# Patient Record
Sex: FEMALE | Race: WHITE | NOT HISPANIC OR LATINO | Employment: FULL TIME | URBAN - METROPOLITAN AREA
[De-identification: names, ages, dates, MRNs, and addresses within clinical notes are randomized per-mention and may not be internally consistent; named-entity substitution may affect disease eponyms.]

---

## 2020-07-16 PROCEDURE — U0003 INFECTIOUS AGENT DETECTION BY NUCLEIC ACID (DNA OR RNA); SEVERE ACUTE RESPIRATORY SYNDROME CORONAVIRUS 2 (SARS-COV-2) (CORONAVIRUS DISEASE [COVID-19]), AMPLIFIED PROBE TECHNIQUE, MAKING USE OF HIGH THROUGHPUT TECHNOLOGIES AS DESCRIBED BY CMS-2020-01-R: HCPCS | Performed by: PERSONAL EMERGENCY RESPONSE ATTENDANT

## 2020-07-19 ENCOUNTER — TELEPHONE (OUTPATIENT)
Dept: URGENT CARE | Facility: CLINIC | Age: 20
End: 2020-07-19

## 2021-01-07 ENCOUNTER — OFFICE VISIT (OUTPATIENT)
Dept: NEUROLOGY | Facility: CLINIC | Age: 21
End: 2021-01-07

## 2021-01-07 VITALS
SYSTOLIC BLOOD PRESSURE: 118 MMHG | DIASTOLIC BLOOD PRESSURE: 82 MMHG | WEIGHT: 265 LBS | TEMPERATURE: 96.4 F | OXYGEN SATURATION: 99 % | HEART RATE: 121 BPM | HEIGHT: 65 IN | BODY MASS INDEX: 44.15 KG/M2

## 2021-01-07 DIAGNOSIS — R20.8 DYSESTHESIA: ICD-10-CM

## 2021-01-07 DIAGNOSIS — R20.2 PARESTHESIAS: Primary | ICD-10-CM

## 2021-01-07 PROCEDURE — 99204 OFFICE O/P NEW MOD 45 MIN: CPT | Performed by: PSYCHIATRY & NEUROLOGY

## 2021-01-07 RX ORDER — CYCLOBENZAPRINE HCL 10 MG
TABLET ORAL
COMMUNITY
Start: 2020-12-30

## 2021-01-07 RX ORDER — QUETIAPINE FUMARATE 200 MG/1
200 TABLET, FILM COATED ORAL
COMMUNITY
Start: 2020-12-30

## 2021-01-07 RX ORDER — TRAZODONE HYDROCHLORIDE 100 MG/1
100 TABLET ORAL
COMMUNITY
Start: 2020-12-30 | End: 2023-01-04

## 2021-01-07 RX ORDER — PRAZOSIN HYDROCHLORIDE 5 MG/1
5 CAPSULE ORAL
COMMUNITY
Start: 2020-12-30

## 2021-01-07 RX ORDER — HYDROXYZINE PAMOATE 50 MG/1
CAPSULE ORAL
COMMUNITY
Start: 2020-12-30 | End: 2023-01-04 | Stop reason: SDUPTHER

## 2021-01-07 RX ORDER — MELOXICAM 15 MG/1
TABLET ORAL
COMMUNITY
Start: 2020-12-29

## 2021-01-07 RX ORDER — MULTIPLE VITAMINS W/ MINERALS TAB 9MG-400MCG
1 TAB ORAL DAILY
COMMUNITY

## 2021-01-07 RX ORDER — LEVOTHYROXINE SODIUM 175 UG/1
175 TABLET ORAL DAILY
COMMUNITY
Start: 2020-11-30

## 2021-01-11 ENCOUNTER — LAB (OUTPATIENT)
Dept: LAB | Facility: HOSPITAL | Age: 21
End: 2021-01-11

## 2021-01-11 DIAGNOSIS — R20.8 DYSESTHESIA: ICD-10-CM

## 2021-01-11 LAB
ERYTHROCYTE [SEDIMENTATION RATE] IN BLOOD: 12 MM/HR (ref 0–20)
FOLATE SERPL-MCNC: 10.1 NG/ML (ref 4.78–24.2)
RPR SER QL: NORMAL
VIT B12 BLD-MCNC: 602 PG/ML (ref 211–946)

## 2021-01-11 PROCEDURE — 83516 IMMUNOASSAY NONANTIBODY: CPT

## 2021-01-11 PROCEDURE — 86235 NUCLEAR ANTIGEN ANTIBODY: CPT

## 2021-01-11 PROCEDURE — 86592 SYPHILIS TEST NON-TREP QUAL: CPT

## 2021-01-11 PROCEDURE — 86038 ANTINUCLEAR ANTIBODIES: CPT

## 2021-01-11 PROCEDURE — 85652 RBC SED RATE AUTOMATED: CPT

## 2021-01-11 PROCEDURE — 84155 ASSAY OF PROTEIN SERUM: CPT

## 2021-01-11 PROCEDURE — 86618 LYME DISEASE ANTIBODY: CPT

## 2021-01-11 PROCEDURE — 86225 DNA ANTIBODY NATIVE: CPT

## 2021-01-11 PROCEDURE — 84165 PROTEIN E-PHORESIS SERUM: CPT

## 2021-01-11 PROCEDURE — 36415 COLL VENOUS BLD VENIPUNCTURE: CPT

## 2021-01-11 PROCEDURE — 82595 ASSAY OF CRYOGLOBULIN: CPT

## 2021-01-11 PROCEDURE — 82784 ASSAY IGA/IGD/IGG/IGM EACH: CPT

## 2021-01-11 PROCEDURE — 82607 VITAMIN B-12: CPT

## 2021-01-11 PROCEDURE — 82746 ASSAY OF FOLIC ACID SERUM: CPT

## 2021-01-11 PROCEDURE — 86255 FLUORESCENT ANTIBODY SCREEN: CPT

## 2021-01-12 LAB
ALBUMIN SERPL ELPH-MCNC: 3.8 G/DL (ref 2.9–4.4)
ALBUMIN/GLOB SERPL: 1.1 {RATIO} (ref 0.7–1.7)
ALPHA1 GLOB SERPL ELPH-MCNC: 0.2 G/DL (ref 0–0.4)
ALPHA2 GLOB SERPL ELPH-MCNC: 0.9 G/DL (ref 0.4–1)
ANA SER QL: NEGATIVE
B BURGDOR IGG SER QL: NEGATIVE
B BURGDOR IGM SER QL: NEGATIVE
B-GLOBULIN SERPL ELPH-MCNC: 1.3 G/DL (ref 0.7–1.3)
DSDNA AB SER-ACNC: <1 IU/ML (ref 0–9)
ENA SS-A AB SER-ACNC: <0.2 AI (ref 0–0.9)
ENA SS-B AB SER-ACNC: <0.2 AI (ref 0–0.9)
ENDOMYSIUM IGA SER QL: NEGATIVE
GAMMA GLOB SERPL ELPH-MCNC: 1.2 G/DL (ref 0.4–1.8)
GLOBULIN SER CALC-MCNC: 3.6 G/DL (ref 2.2–3.9)
IGA SERPL-MCNC: 325 MG/DL (ref 87–352)
LABORATORY COMMENT REPORT: NORMAL
M PROTEIN SERPL ELPH-MCNC: NORMAL G/DL
PROT PATTERN SERPL ELPH-IMP: NORMAL
PROT SERPL-MCNC: 7.4 G/DL (ref 6–8.5)
TTG IGA SER-ACNC: <2 U/ML (ref 0–3)

## 2021-01-15 LAB — CRYOGLOB SER QL 1D COLD INC: NORMAL

## 2025-03-27 ENCOUNTER — TELEMEDICINE (OUTPATIENT)
Dept: PSYCHIATRY | Facility: CLINIC | Age: 25
End: 2025-03-27

## 2025-03-27 DIAGNOSIS — F33.41 RECURRENT MAJOR DEPRESSIVE DISORDER, IN PARTIAL REMISSION: ICD-10-CM

## 2025-03-27 DIAGNOSIS — F51.04 PSYCHOPHYSIOLOGICAL INSOMNIA: ICD-10-CM

## 2025-03-27 DIAGNOSIS — F43.10 PTSD (POST-TRAUMATIC STRESS DISORDER): ICD-10-CM

## 2025-03-27 DIAGNOSIS — F90.2 ATTENTION DEFICIT HYPERACTIVITY DISORDER (ADHD), COMBINED TYPE: Primary | ICD-10-CM

## 2025-03-27 DIAGNOSIS — F84.0 AUTISM: ICD-10-CM

## 2025-03-27 DIAGNOSIS — F41.1 GAD (GENERALIZED ANXIETY DISORDER): ICD-10-CM

## 2025-03-27 DIAGNOSIS — F60.3 BORDERLINE PERSONALITY DISORDER: ICD-10-CM

## 2025-03-27 DIAGNOSIS — F43.12 NIGHTMARES ASSOCIATED WITH CHRONIC POST-TRAUMATIC STRESS DISORDER: ICD-10-CM

## 2025-03-27 DIAGNOSIS — F51.5 NIGHTMARES ASSOCIATED WITH CHRONIC POST-TRAUMATIC STRESS DISORDER: ICD-10-CM

## 2025-03-27 RX ORDER — PRAZOSIN HYDROCHLORIDE 2 MG/1
2 CAPSULE ORAL NIGHTLY
Qty: 30 CAPSULE | Refills: 6 | Status: SHIPPED | OUTPATIENT
Start: 2025-03-27

## 2025-03-27 RX ORDER — DEXTROAMPHETAMINE SACCHARATE, AMPHETAMINE ASPARTATE MONOHYDRATE, DEXTROAMPHETAMINE SULFATE AND AMPHETAMINE SULFATE 5; 5; 5; 5 MG/1; MG/1; MG/1; MG/1
20 CAPSULE, EXTENDED RELEASE ORAL EVERY MORNING
Qty: 30 CAPSULE | Refills: 0 | Status: SHIPPED | OUTPATIENT
Start: 2025-03-27

## 2025-03-27 RX ORDER — MIRTAZAPINE 30 MG/1
30 TABLET, FILM COATED ORAL NIGHTLY
Qty: 30 TABLET | Refills: 6 | Status: SHIPPED | OUTPATIENT
Start: 2025-03-27 | End: 2026-03-27

## 2025-03-27 RX ORDER — QUETIAPINE FUMARATE 100 MG/1
100 TABLET, FILM COATED ORAL NIGHTLY
Qty: 30 TABLET | Refills: 6 | Status: SHIPPED | OUTPATIENT
Start: 2025-03-27

## 2025-03-27 RX ORDER — GUANFACINE 2 MG/1
2 TABLET, EXTENDED RELEASE ORAL DAILY
Qty: 30 TABLET | Refills: 6 | Status: SHIPPED | OUTPATIENT
Start: 2025-03-27

## 2025-03-27 RX ORDER — LAMOTRIGINE 100 MG/1
100 TABLET ORAL DAILY
Qty: 30 TABLET | Refills: 6 | Status: SHIPPED | OUTPATIENT
Start: 2025-03-27 | End: 2026-03-27

## 2025-03-27 RX ORDER — LEVOTHYROXINE SODIUM 137 UG/1
274 TABLET ORAL
COMMUNITY

## 2025-03-27 RX ORDER — HYDROXYZINE PAMOATE 50 MG/1
50 CAPSULE ORAL 2 TIMES DAILY
Qty: 60 CAPSULE | Refills: 6 | Status: SHIPPED | OUTPATIENT
Start: 2025-03-27

## 2025-03-27 NOTE — PROGRESS NOTES
Patient Name: Mandy Hackett  MRN: [id number]   :  2000     Referring Physician: Edgar Chester MD    This provider is located in her home office through the [facility] (through [facility]), [address], [address] using a secure MyChart Video Visit through [employer]. Patient is being seen remotely via telehealth at their home address in [place], and stated they are in a secure environment for this session. The patient's condition being diagnosed/treated is appropriate for telemedicine. The provider identified herself as well as her credentials.   The patient, and/or patients guardian, consent to be seen remotely, and when consent is given they understand that the consent allows for patient identifiable information to be sent to a third party as needed.   They may refuse to be seen remotely at any time. The electronic data is encrypted and password protected, and the patient and/or guardian has been advised of the potential risks to privacy not withstanding such measures.    You have chosen to receive care through a telehealth visit.  Do you consent to use a video/audio connection for your medical care today? Yes    Chief Complaint:     ICD-10-CM ICD-9-CM   1. Attention deficit hyperactivity disorder (ADHD), combined type  F90.2 314.01   2. PTSD (post-traumatic stress disorder)  F43.10 309.81   3. Nightmares associated with chronic post-traumatic stress disorder  F51.5 307.47    F43.12 309.81   4. KRISTEN (generalized anxiety disorder)  F41.1 300.02   5. Recurrent major depressive disorder, in partial remission  F33.41 296.35   6. Autism  F84.0 299.00   7. Borderline personality disorder  F60.3 301.83   8. Psychophysiological insomnia  F51.04 307.42       HPI:   Mandy Hackett is a 25 y.o. female who is here today for initial evaluation of ADHD, Anxiety , Autism Spectrum Disorder, Depression, Insomnia , and PTSD, Nightmares, and Borderline  Personality Disorder.  Patient needing a new prescriber as their old prescriber was no longer able to see them.  Patient prefers the pronouns they, them, and theirs.  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  Patient has many mental health diagnoses.  Patient feels all are under good control except for the ADHD.  Patient is currently taking Adderall XR 10 mg every morning.  Does not feel it lasts long enough.  Still struggles some with focus and task completion.  Feels the [employer] does a good job with helping manage their hyperactivity.  Patient states Lamictal helps keep her mood and anxiety level.  Prazosin is helping with nightmares.  Seroquel and mirtazapine help with insomnia.  Patient also takes hydroxyzine at bedtime.  Patient sleep is off at times as they move between working day shift and night shift.  The plan is to work night shift once they start working as a nurse.  Patient also has a history of sleep apnea as well.    Past Medical History:   Past Medical History:   Diagnosis Date    Bladder infection     Bronchitis     Claustrophobia     Depression     Hypertension     Migraine     Pneumonia     Thyroid disease        Past Surgical History:   Past Surgical History:   Procedure Laterality Date    DENTAL PROCEDURE         Social History:   Social History     Socioeconomic History    Marital status: Single   Tobacco Use    Smoking status: Every Day     Types: Cigarettes    Smokeless tobacco: Never   Vaping Use    Vaping status: Never Used   Substance and Sexual Activity    Alcohol use: Never    Drug use: Never    Sexual activity: Defer       Family History:  Family History   Problem Relation Age of Onset    Diabetes Maternal Grandmother     Diabetes Maternal Grandfather        Allergy:  Allergies   Allergen Reactions    Amoxicillin Hives and Unknown (See Comments)    Dilaudid  [Hydromorphone] Hives    Fluconazole Hives and Unknown (See Comments)    Sulfa Antibiotics Hives and Unknown (See Comments)    Trileptal [Oxcarbazepine] Hives    Penicillin G Unknown (See Comments)    Sulfamethoxazole-Trimethoprim Rash       Current Medications:   Current Outpatient Medications   Medication Sig Dispense Refill    hydrOXYzine pamoate (VISTARIL) 50 MG capsule Take 1 capsule by mouth 2 (Two) Times a Day. 60 capsule 6    levothyroxine (SYNTHROID, LEVOTHROID) 137 MCG tablet Take 2 tablets by mouth Every Morning.      amphetamine-dextroamphetamine XR (Adderall XR) 20 MG 24 hr capsule Take 1 capsule by mouth Every Morning 30 capsule 0    cyclobenzaprine (FLEXERIL) 10 MG tablet       guanFACINE HCl ER 2 MG tablet sustained-release 24 hour Take 2 mg by mouth Daily. 30 tablet 6    Lactase (LACTAID PO) Take  by mouth.      lamoTRIgine (LaMICtal) 100 MG tablet Take 1 tablet by mouth Daily. 30 tablet 6    mirtazapine (Remeron) 30 MG tablet Take 1 tablet by mouth Every Night. 30 tablet 6    prazosin (MINIPRESS) 2 MG capsule Take 1 capsule by mouth Every Night. 30 capsule 6    QUEtiapine (SEROquel) 100 MG tablet Take 1 tablet by mouth Every Night. 30 tablet 6     No current facility-administered medications for this visit.       Lab Results:   No visits with results within 3 Month(s) from this visit.   Latest known visit with results is:   Lab on 01/11/2021   Component Date Value Ref Range Status    JILLIAN Direct 01/11/2021 Negative  Negative Final    Anti-DNA (DS) Ab Qn 01/11/2021 <1  0 - 9 IU/mL Final                                       Negative      <5                                     Equivocal  5 - 9                                     Positive      >9    Endomysial IgA 01/11/2021 Negative  Negative Final    Serum is slightly hemolyzed    Tissue Transglutaminase IgA 01/11/2021 <2  0 - 3 U/mL Final                                  Negative        0 -  3                                Weak Positive   4 - 10                                 Positive           >10   Tissue Transglutaminase (tTG) has been identified   as the endomysial antigen.  Studies have demonstr-   ated that endomysial IgA antibodies have over 99%   specificity for gluten sensitive enteropathy.    IgA 01/11/2021 325  87 - 352 mg/dL Final    Lyme Ab IgG 01/11/2021 Negative  Negative Final    Lyme Ab IgM 01/11/2021 Negative  Negative Final    Cryoglobulin, Ql, Serum, Rflx 01/11/2021 Comment  None detected Final    None Detected at 72 hours  This test was developed and its performance characteristics  determined by [facility]. It has not been cleared or approved  by the Food and Drug Administration.    Total Protein 01/11/2021 7.4  6.0 - 8.5 g/dL Final    Albumin 01/11/2021 3.8  2.9 - 4.4 g/dL Final    Alpha-1-Globulin 01/11/2021 0.2  0.0 - 0.4 g/dL Final    Alpha-2-Globulin 01/11/2021 0.9  0.4 - 1.0 g/dL Final    Beta Globulin 01/11/2021 1.3  0.7 - 1.3 g/dL Final    Gamma Globulin 01/11/2021 1.2  0.4 - 1.8 g/dL Final    M-Joshua 01/11/2021 Not Observed  Not Observed g/dL Final    Globulin 01/11/2021 3.6  2.2 - 3.9 g/dL Final    A/G Ratio 01/11/2021 1.1  0.7 - 1.7 Final    Please note 01/11/2021 Comment   Final    Protein electrophoresis scan will follow via computer, mail, or   delivery.    SPE Interpretation 01/11/2021 Comment   Final    The SPE pattern appears unremarkable. Evidence of monoclonal  protein is not apparent.    RPR 01/11/2021 Non-Reactive  Non-Reactive Final    Sed Rate 01/11/2021 12  0 - 20 mm/hr Final    Sjogren's Anti-SS-A 01/11/2021 <0.2  0.0 - 0.9 AI Final    Sjogren's Anti-SS-B 01/11/2021 <0.2  0.0 - 0.9 AI Final    Folate 01/11/2021 10.10  4.78 - 24.20 ng/mL Final    Vitamin B-12 01/11/2021 602  211 - 946 pg/mL Final       Review of Symptoms:   Review of Systems   Constitutional:  Negative for activity change, appetite change, fatigue, unexpected weight gain and unexpected weight loss.   Respiratory:  Negative for shortness of  breath and wheezing.    Gastrointestinal:  Negative for constipation, diarrhea, nausea and vomiting.   Musculoskeletal:  Negative for gait problem.   Skin:  Negative for dry skin and rash.   Neurological:  Negative for dizziness, speech difficulty, weakness, light-headedness, headache, memory problem and confusion.   Psychiatric/Behavioral:  Positive for decreased concentration. Negative for agitation, behavioral problems, dysphoric mood, hallucinations, self-injury, sleep disturbance, suicidal ideas, negative for hyperactivity, depressed mood and stress. The patient is not nervous/anxious.        Physical Exam:   Physical Exam  Constitutional:       General: Mandy is not in acute distress.     Appearance: Mandy is well-developed. Mandy is not diaphoretic.   HENT:      Head: Normocephalic and atraumatic.   Eyes:      Conjunctiva/sclera: Conjunctivae normal.   Pulmonary:      Effort: Pulmonary effort is normal. No respiratory distress.   Musculoskeletal:         General: Normal range of motion.      Cervical back: Full passive range of motion without pain and normal range of motion.   Neurological:      Mental Status: Mandy is alert and oriented to person, place, and time.   Psychiatric:         Mood and Affect: Mood is not anxious or depressed. Affect is not labile, blunt, angry or inappropriate.         Speech: Speech is not rapid and pressured or tangential.         Behavior: Behavior normal. Behavior is not agitated, slowed, aggressive, withdrawn, hyperactive or combative. Behavior is cooperative.         Thought Content: Thought content normal. Thought content is not paranoid or delusional. Thought content does not include homicidal or suicidal ideation. Thought content does not include homicidal or suicidal plan.         Judgment: Judgment normal.       There were no vitals taken for this visit.  There is no height or weight on file to calculate BMI. Video appt unable to obtain.     Mental Status Exam:    Appearance: appropriate  Hygiene:   good  Cooperation:  Cooperative  Eye Contact:  Good  Psychomotor Behavior:  Appropriate  Mood:decreased range  Affect:  Restricted  Hopelessness: Denies  Speech:  Normal  Thought Process:  Linear  Thought Content:  Normal  Suicidal:  None  Homicidal:  None  Hallucinations:  None  Delusion:  None  Memory:  Intact  Orientation:  Person, Place, Time, and Situation  Reliability:  good  Insight:  Good  Judgement:  Good  Impulse Control:  Good    PHQ-9 Depression Screening  Little interest or pleasure in doing things?     Feeling down, depressed, or hopeless?     Trouble falling or staying asleep, or sleeping too much?     Feeling tired or having little energy?     Poor appetite or overeating?     Feeling bad about yourself - or that you are a failure or have let yourself or your family down?     Trouble concentrating on things, such as reading the newspaper or watching television?     Moving or speaking so slowly that other people could have noticed? Or the opposite - being so fidgety or restless that you have been moving around a lot more than usual?     Thoughts that you would be better off dead, or of hurting yourself in some way?     PHQ-9 Total Score     If you checked off any problems, how difficult have these problems made it for you to do your work, take care of things at home, or get along with other people?        Assessment/Plan:   Diagnoses and all orders for this visit:    1. Attention deficit hyperactivity disorder (ADHD), combined type (Primary)  -     amphetamine-dextroamphetamine XR (Adderall XR) 20 MG 24 hr capsule; Take 1 capsule by mouth Every Morning  Dispense: 30 capsule; Refill: 0  -     guanFACINE HCl ER 2 MG tablet sustained-release 24 hour; Take 2 mg by mouth Daily.  Dispense: 30 tablet; Refill: 6    2. PTSD (post-traumatic stress disorder)  -     lamoTRIgine (LaMICtal) 100 MG tablet; Take 1 tablet by mouth Daily.  Dispense: 30 tablet; Refill: 6  -      prazosin (MINIPRESS) 2 MG capsule; Take 1 capsule by mouth Every Night.  Dispense: 30 capsule; Refill: 6    3. Nightmares associated with chronic post-traumatic stress disorder  -     prazosin (MINIPRESS) 2 MG capsule; Take 1 capsule by mouth Every Night.  Dispense: 30 capsule; Refill: 6  -     QUEtiapine (SEROquel) 100 MG tablet; Take 1 tablet by mouth Every Night.  Dispense: 30 tablet; Refill: 6    4. KRISTEN (generalized anxiety disorder)    5. Recurrent major depressive disorder, in partial remission  -     lamoTRIgine (LaMICtal) 100 MG tablet; Take 1 tablet by mouth Daily.  Dispense: 30 tablet; Refill: 6  -     hydrOXYzine pamoate (VISTARIL) 50 MG capsule; Take 1 capsule by mouth 2 (Two) Times a Day.  Dispense: 60 capsule; Refill: 6    6. Autism    7. Borderline personality disorder    8. Psychophysiological insomnia  -     QUEtiapine (SEROquel) 100 MG tablet; Take 1 tablet by mouth Every Night.  Dispense: 30 tablet; Refill: 6  -     mirtazapine (Remeron) 30 MG tablet; Take 1 tablet by mouth Every Night.  Dispense: 30 tablet; Refill: 6  -     hydrOXYzine pamoate (VISTARIL) 50 MG capsule; Take 1 capsule by mouth 2 (Two) Times a Day.  Dispense: 60 capsule; Refill: 6    Patient feels their medications manage their PTSD, nightmares, anxiety, depression, and insomnia.  They feel they are struggling with focus and task completion.  We will increase Adderall XR to 20 mg every morning.  We will follow-up in a month.    A psychological evaluation was conducted in order to assess past and current level of functioning. Areas assessed included, but were not limited to: perception of social support, perception of ability to face and deal with challenges in life (positive functioning), anxiety symptoms, depressive symptoms, perspective on beliefs/belief system, coping skills for stress, intelligence level,  Therapeutic rapport was established. Interventions conducted today were geared towards incorporating medication management  along with support for continued therapy. Education was also provided as to the med management with this provider and what to expect in subsequent sessions.    We discussed risks, benefits,goals and side effects of the above medication and the patient was agreeable with the plan.Patient was educated on the importance of compliance with treatment and follow-up appointments. Patient is aware to contact the [facility] [phone] with any worsening of symptoms. To call for questions or concerns and return early if necessary. Patent is agreeable to go to the Emergency Department or call 911 should they begin SI/HI.     Part of this note may be an electronic transcription/translation of spoken language to printed text using the Dragon Dictation System.    Return in about 4 weeks (around 4/24/2025) for Follow Up 30 min, Video visit.    MITZI Palencia

## 2025-04-16 ENCOUNTER — TELEPHONE (OUTPATIENT)
Dept: PSYCHIATRY | Facility: CLINIC | Age: 25
End: 2025-04-16
Payer: COMMERCIAL

## 2025-04-16 NOTE — TELEPHONE ENCOUNTER
Patient's insurance is not participating with [facility].   attempted to contact the patient but did not get an answer.  Left a voicemail and sent a my chart message for patient to contact our office.

## 2025-04-21 ENCOUNTER — TELEPHONE (OUTPATIENT)
Dept: PSYCHIATRY | Facility: CLINIC | Age: 25
End: 2025-04-21
Payer: COMMERCIAL

## 2025-04-21 NOTE — TELEPHONE ENCOUNTER
Patient is scheduled with provider Abiola Conner tomorrow 04/22/2025 and our office is not participating with this patient's insurance.   has attempted to contact this patient by phone but patient's number is out of service.  Sent patient a my chart message to contact our office.

## 2025-05-19 ENCOUNTER — TELEPHONE (OUTPATIENT)
Dept: PSYCHIATRY | Facility: CLINIC | Age: 25
End: 2025-05-19
Payer: COMMERCIAL

## 2025-05-19 DIAGNOSIS — F33.1 MODERATE EPISODE OF RECURRENT MAJOR DEPRESSIVE DISORDER: Primary | ICD-10-CM

## 2025-05-19 DIAGNOSIS — F90.2 ATTENTION DEFICIT HYPERACTIVITY DISORDER (ADHD), COMBINED TYPE: ICD-10-CM

## 2025-05-19 RX ORDER — DEXTROAMPHETAMINE SACCHARATE, AMPHETAMINE ASPARTATE MONOHYDRATE, DEXTROAMPHETAMINE SULFATE, AMPHETAMINE SULFATE 6.25; 6.25; 6.25; 6.25 MG/1; MG/1; MG/1; MG/1
25 CAPSULE, EXTENDED RELEASE ORAL EVERY MORNING
Qty: 30 CAPSULE | Refills: 0 | Status: CANCELLED | OUTPATIENT
Start: 2025-05-19

## 2025-05-19 NOTE — TELEPHONE ENCOUNTER
Pt called stating they are having intrusive thoughts and this may be causing focus to be off. Wondering what to do. DC Seroquel and start Vraylar 1.5 mg daily. Copay card instructions given.

## 2025-06-09 ENCOUNTER — TELEMEDICINE (OUTPATIENT)
Dept: PSYCHIATRY | Facility: CLINIC | Age: 25
End: 2025-06-09
Payer: COMMERCIAL

## 2025-06-09 ENCOUNTER — PRIOR AUTHORIZATION (OUTPATIENT)
Dept: PSYCHIATRY | Facility: CLINIC | Age: 25
End: 2025-06-09

## 2025-06-09 DIAGNOSIS — F41.1 GAD (GENERALIZED ANXIETY DISORDER): ICD-10-CM

## 2025-06-09 DIAGNOSIS — F33.1 MODERATE EPISODE OF RECURRENT MAJOR DEPRESSIVE DISORDER: Primary | ICD-10-CM

## 2025-06-09 DIAGNOSIS — R41.89 BRAIN FOG: ICD-10-CM

## 2025-06-09 DIAGNOSIS — F84.0 AUTISM: ICD-10-CM

## 2025-06-09 DIAGNOSIS — F90.2 ATTENTION DEFICIT HYPERACTIVITY DISORDER (ADHD), COMBINED TYPE: ICD-10-CM

## 2025-06-09 DIAGNOSIS — F60.3 BORDERLINE PERSONALITY DISORDER: ICD-10-CM

## 2025-06-09 DIAGNOSIS — F43.10 PTSD (POST-TRAUMATIC STRESS DISORDER): ICD-10-CM

## 2025-06-09 DIAGNOSIS — E53.8 VITAMIN B 12 DEFICIENCY: ICD-10-CM

## 2025-06-09 DIAGNOSIS — F43.12 NIGHTMARES ASSOCIATED WITH CHRONIC POST-TRAUMATIC STRESS DISORDER: ICD-10-CM

## 2025-06-09 DIAGNOSIS — F51.04 PSYCHOPHYSIOLOGICAL INSOMNIA: ICD-10-CM

## 2025-06-09 DIAGNOSIS — F51.5 NIGHTMARES ASSOCIATED WITH CHRONIC POST-TRAUMATIC STRESS DISORDER: ICD-10-CM

## 2025-06-09 DIAGNOSIS — E55.9 VITAMIN D DEFICIENCY: ICD-10-CM

## 2025-06-09 RX ORDER — DEXTROAMPHETAMINE SACCHARATE, AMPHETAMINE ASPARTATE MONOHYDRATE, DEXTROAMPHETAMINE SULFATE AND AMPHETAMINE SULFATE 7.5; 7.5; 7.5; 7.5 MG/1; MG/1; MG/1; MG/1
30 CAPSULE, EXTENDED RELEASE ORAL EVERY MORNING
Qty: 30 CAPSULE | Refills: 0 | Status: SHIPPED | OUTPATIENT
Start: 2025-06-09

## 2025-06-09 NOTE — PROGRESS NOTES
Patient Name: Mandy Hackett  MRN: [id number]   :  2000     This provider is located at her home office through the [facility] (through [facility]), [address], [address] using a secure [employer] Video Visit through [facility]. Patient is being seen remotely via telehealth at their home address in [place], and stated they are in a secure environment for this session. The patient's condition being diagnosed/treated is appropriate for telemedicine. The provider identified herself as well as her credentials.   The patient, and/or patients guardian, consent to be seen remotely, and when consent is given they understand that the consent allows for patient identifiable information to be sent to a third party as needed.   They may refuse to be seen remotely at any time. The electronic data is encrypted and password protected, and the patient and/or guardian has been advised of the potential risks to privacy not withstanding such measures.    You have chosen to receive care through a telehealth visit.  Do you consent to use a video/audio connection for your medical care today? Yes    Chief Complaint:      ICD-10-CM ICD-9-CM   1. Moderate episode of recurrent major depressive disorder  F33.1 296.32   2. Attention deficit hyperactivity disorder (ADHD), combined type  F90.2 314.01   3. PTSD (post-traumatic stress disorder)  F43.10 309.81   4. Nightmares associated with chronic post-traumatic stress disorder  F51.5 307.47    F43.12 309.81   5. KRISTEN (generalized anxiety disorder)  F41.1 300.02   6. Autism  F84.0 299.00   7. Borderline personality disorder  F60.3 301.83   8. Psychophysiological insomnia  F51.04 307.42   9. Brain fog  R41.89 799.59   10. Vitamin D deficiency  E55.9 268.9   11. Vitamin B 12 deficiency  E53.8 266.2       History of Present Illness: Mandy Hackett is a 25 y.o. female is here today for medication management follow up.  Patient states mood  wise this is the best that they have felt.  Does feel some brain fog.  And draws a blank at times.    The following portions of the patient's history were reviewed and updated as appropriate: allergies, current medications, past family history, past medical history, past social history, past surgical history, and problem list.    Review of Systems;;  Review of Systems   Constitutional:  Negative for activity change, appetite change, fatigue, unexpected weight gain and unexpected weight loss.   Respiratory:  Negative for shortness of breath and wheezing.    Gastrointestinal:  Negative for constipation, diarrhea, nausea and vomiting.   Musculoskeletal:  Negative for gait problem.   Skin:  Negative for dry skin and rash.   Neurological:  Negative for dizziness, speech difficulty, weakness, light-headedness, headache, memory problem and confusion.   Psychiatric/Behavioral:  Negative for agitation, behavioral problems, decreased concentration, dysphoric mood, hallucinations, self-injury, sleep disturbance, suicidal ideas, negative for hyperactivity, depressed mood and stress. The patient is not nervous/anxious.        Physical Exam;;  Physical Exam  Constitutional:       General: Kathryn is not in acute distress.     Appearance: Kathryn is well-developed. Kathryn is not diaphoretic.   HENT:      Head: Normocephalic and atraumatic.   Eyes:      Conjunctiva/sclera: Conjunctivae normal.   Pulmonary:      Effort: Pulmonary effort is normal. No respiratory distress.   Musculoskeletal:         General: Normal range of motion.      Cervical back: Full passive range of motion without pain and normal range of motion.   Neurological:      Mental Status: Kathryn is alert and oriented to person, place, and time.   Psychiatric:         Mood and Affect: Mood is not anxious or depressed. Affect is not labile, blunt, angry or inappropriate.         Speech: Speech is not rapid and pressured or tangential.         Behavior: Behavior normal. Behavior is  not agitated, slowed, aggressive, withdrawn, hyperactive or combative. Behavior is cooperative.         Thought Content: Thought content normal. Thought content is not paranoid or delusional. Thought content does not include homicidal or suicidal ideation. Thought content does not include homicidal or suicidal plan.         Judgment: Judgment normal.       There were no vitals taken for this visit.  There is no height or weight on file to calculate BMI. Video appt unable to obtain.      Current Medications;;    Current Outpatient Medications:     Cariprazine HCl (Vraylar) 1.5 MG capsule capsule, Take 1 capsule by mouth Daily. At bedtime, Disp: 30 capsule, Rfl: 1    amphetamine-dextroamphetamine XR (Adderall XR) 30 MG 24 hr capsule, Take 1 capsule by mouth Every Morning, Disp: 30 capsule, Rfl: 0    cyclobenzaprine (FLEXERIL) 10 MG tablet, , Disp: , Rfl:     guanFACINE HCl ER 2 MG tablet sustained-release 24 hour, Take 2 mg by mouth Daily., Disp: 30 tablet, Rfl: 6    hydrOXYzine pamoate (VISTARIL) 50 MG capsule, Take 1 capsule by mouth 2 (Two) Times a Day., Disp: 60 capsule, Rfl: 6    Lactase (LACTAID PO), Take  by mouth., Disp: , Rfl:     lamoTRIgine (LaMICtal) 100 MG tablet, Take 1 tablet by mouth Daily., Disp: 30 tablet, Rfl: 6    levothyroxine (SYNTHROID, LEVOTHROID) 137 MCG tablet, Take 2 tablets by mouth Every Morning., Disp: , Rfl:     mirtazapine (Remeron) 30 MG tablet, Take 1 tablet by mouth Every Night., Disp: 30 tablet, Rfl: 6    prazosin (MINIPRESS) 2 MG capsule, Take 1 capsule by mouth Every Night., Disp: 30 capsule, Rfl: 6    Lab Results:   No visits with results within 3 Month(s) from this visit.   Latest known visit with results is:   Lab on 01/11/2021   Component Date Value Ref Range Status    JILLIAN Direct 01/11/2021 Negative  Negative Final    Anti-DNA (DS) Ab Qn 01/11/2021 <1  0 - 9 IU/mL Final                                       Negative      <5                                     Equivocal  5 -  9                                     Positive      >9    Endomysial IgA 01/11/2021 Negative  Negative Final    Serum is slightly hemolyzed    Tissue Transglutaminase IgA 01/11/2021 <2  0 - 3 U/mL Final                                  Negative        0 -  3                                Weak Positive   4 - 10                                Positive           >10   Tissue Transglutaminase (tTG) has been identified   as the endomysial antigen.  Studies have demonstr-   ated that endomysial IgA antibodies have over 99%   specificity for gluten sensitive enteropathy.    IgA 01/11/2021 325  87 - 352 mg/dL Final    Lyme Ab IgG 01/11/2021 Negative  Negative Final    Lyme Ab IgM 01/11/2021 Negative  Negative Final    Cryoglobulin, Ql, Serum, Rflx 01/11/2021 Comment  None detected Final    None Detected at 72 hours  This test was developed and its performance characteristics  determined by [facility]. It has not been cleared or approved  by the Food and Drug Administration.    Total Protein 01/11/2021 7.4  6.0 - 8.5 g/dL Final    Albumin 01/11/2021 3.8  2.9 - 4.4 g/dL Final    Alpha-1-Globulin 01/11/2021 0.2  0.0 - 0.4 g/dL Final    Alpha-2-Globulin 01/11/2021 0.9  0.4 - 1.0 g/dL Final    Beta Globulin 01/11/2021 1.3  0.7 - 1.3 g/dL Final    Gamma Globulin 01/11/2021 1.2  0.4 - 1.8 g/dL Final    M-Joshua 01/11/2021 Not Observed  Not Observed g/dL Final    Globulin 01/11/2021 3.6  2.2 - 3.9 g/dL Final    A/G Ratio 01/11/2021 1.1  0.7 - 1.7 Final    Please note 01/11/2021 Comment   Final    Protein electrophoresis scan will follow via computer, mail, or   delivery.    SPE Interpretation 01/11/2021 Comment   Final    The SPE pattern appears unremarkable. Evidence of monoclonal  protein is not apparent.    RPR 01/11/2021 Non-Reactive  Non-Reactive Final    Sed Rate 01/11/2021 12  0 - 20 mm/hr Final    Sjogren's Anti-SS-A 01/11/2021 <0.2  0.0 - 0.9 AI Final    Sjogren's Anti-SS-B 01/11/2021 <0.2  0.0 - 0.9 AI Final    Folate  01/11/2021 10.10  4.78 - 24.20 ng/mL Final    Vitamin B-12 01/11/2021 602  211 - 946 pg/mL Final       Mental Status Exam:   Hygiene:   good  Cooperation:  Cooperative  Eye Contact:  Good  Psychomotor Behavior:  Appropriate  Mood:within normal limits  Affect:  Appropriate  Hopelessness: Denies  Speech:  Normal  Thought Process:  Goal directed  Thought Content:  Normal  Suicidal:  None  Homicidal:  None  Hallucinations:  None  Delusion:  None  Memory:  Intact  Orientation:  Person, Place, Time, and Situation  Reliability:  good  Insight:  Good  Judgement:  Good  Impulse Control:  Good          PHQ-9 Depression Screening  Little interest or pleasure in doing things? (Patient-Rptd) Not at all   Feeling down, depressed, or hopeless? (Patient-Rptd) Not at all   PHQ-2 Total Score (Patient-Rptd) 0   Trouble falling or staying asleep, or sleeping too much? (Patient-Rptd) Over half   Feeling tired or having little energy? (Patient-Rptd) Several days   Poor appetite or overeating? (Patient-Rptd) Almost all   Feeling bad about yourself - or that you are a failure or have let yourself or your family down? (Patient-Rptd) Not at all   Trouble concentrating on things, such as reading the newspaper or watching television? (Patient-Rptd) Over half   Moving or speaking so slowly that other people could have noticed? Or the opposite - being so fidgety or restless that you have been moving around a lot more than usual? (Patient-Rptd) Not at all   Thoughts that you would be better off dead, or of hurting yourself in some way? (Patient-Rptd) Not at all   PHQ-9 Total Score (Patient-Rptd) 8   If you checked off any problems, how difficult have these problems made it for you to do your work, take care of things at home, or get along with other people? (Patient-Rptd) Somewhat difficult     NEXT UDS DUE: order sent pt to obtain asap.       Assessment/Plan:  Diagnoses and all orders for this visit:    1. Moderate episode of recurrent major  depressive disorder (Primary)  -     Vitamin D 25 Hydroxy; Future  -     Vitamin B12; Future  -     Sedimentation Rate; Future  -     C-reactive Protein; Future  -     Cariprazine HCl (Vraylar) 1.5 MG capsule capsule; Take 1 capsule by mouth Daily. At bedtime  Dispense: 30 capsule; Refill: 1    2. Attention deficit hyperactivity disorder (ADHD), combined type  -     amphetamine-dextroamphetamine XR (Adderall XR) 30 MG 24 hr capsule; Take 1 capsule by mouth Every Morning  Dispense: 30 capsule; Refill: 0  -     Urine Drug Screen - Urine, Clean Catch; Future    3. PTSD (post-traumatic stress disorder)    4. Nightmares associated with chronic post-traumatic stress disorder    5. KRISTEN (generalized anxiety disorder)    6. Autism    7. Borderline personality disorder    8. Psychophysiological insomnia    9. Brain fog  -     Vitamin D 25 Hydroxy; Future  -     Vitamin B12; Future  -     Sedimentation Rate; Future  -     C-reactive Protein; Future    10. Vitamin D deficiency  -     Vitamin D 25 Hydroxy; Future    11. Vitamin B 12 deficiency  -     Vitamin B12; Future      Patient having some brain fog.  We will get additional lab work to rule out any inflammation or vitamin deficiencies.  We will also increase their Adderall XR to 30 mg every morning.  Patient is comfortable following up in 3 months.      We discussed risks, benefits,goals and side effects of the above medication and the patient was agreeable with the plan.Patient was educated on the importance of compliance with treatment and follow-up appointments. Patient is aware to contact the [facility] [phone] with any worsening of symptoms. To call for questions or concerns and return early if necessary. Patent is agreeable to go to the Emergency Department or call 911 should they begin SI/HI.     Part of this note may be an electronic transcription/translation of spoken language to printed text using the Dragon Dictation  System.    Return in about 3 months (around 9/9/2025) for Follow Up 30 min, Video visit.    Abiola Conner, APRN

## 2025-07-23 DIAGNOSIS — F90.2 ATTENTION DEFICIT HYPERACTIVITY DISORDER (ADHD), COMBINED TYPE: ICD-10-CM

## 2025-07-23 RX ORDER — DEXTROAMPHETAMINE SACCHARATE, AMPHETAMINE ASPARTATE MONOHYDRATE, DEXTROAMPHETAMINE SULFATE AND AMPHETAMINE SULFATE 7.5; 7.5; 7.5; 7.5 MG/1; MG/1; MG/1; MG/1
30 CAPSULE, EXTENDED RELEASE ORAL EVERY MORNING
Qty: 30 CAPSULE | Refills: 0 | Status: SHIPPED | OUTPATIENT
Start: 2025-07-23

## 2025-08-08 ENCOUNTER — DOCUMENTATION (OUTPATIENT)
Dept: PSYCHIATRY | Facility: CLINIC | Age: 25
End: 2025-08-08
Payer: COMMERCIAL

## 2025-08-08 RX ORDER — PRAZOSIN HYDROCHLORIDE 5 MG/1
5 CAPSULE ORAL NIGHTLY
Qty: 30 CAPSULE | Refills: 1 | Status: SHIPPED | OUTPATIENT
Start: 2025-08-08